# Patient Record
Sex: MALE | Race: BLACK OR AFRICAN AMERICAN | NOT HISPANIC OR LATINO | Employment: UNEMPLOYED | URBAN - METROPOLITAN AREA
[De-identification: names, ages, dates, MRNs, and addresses within clinical notes are randomized per-mention and may not be internally consistent; named-entity substitution may affect disease eponyms.]

---

## 2018-05-26 ENCOUNTER — HOSPITAL ENCOUNTER (EMERGENCY)
Facility: HOSPITAL | Age: 53
Discharge: HOME | End: 2018-05-26
Attending: EMERGENCY MEDICINE
Payer: MEDICAID

## 2018-05-26 VITALS
HEART RATE: 78 BPM | SYSTOLIC BLOOD PRESSURE: 117 MMHG | OXYGEN SATURATION: 98 % | RESPIRATION RATE: 18 BRPM | TEMPERATURE: 98 F | DIASTOLIC BLOOD PRESSURE: 74 MMHG

## 2018-05-26 DIAGNOSIS — D35.2 PITUITARY ADENOMA: Primary | ICD-10-CM

## 2018-05-26 DIAGNOSIS — I95.1 ORTHOSTATIC HYPOTENSION: ICD-10-CM

## 2018-05-26 DIAGNOSIS — R42 DIZZINESS: ICD-10-CM

## 2018-05-26 LAB
ALBUMIN SERPL-MCNC: 3.1 G/DL (ref 3.3–5.5)
ALP SERPL-CCNC: 500 U/L (ref 42–141)
BILIRUB SERPL-MCNC: 1 MG/DL (ref 0.2–1.6)
BUN SERPL-MCNC: 21 MG/DL (ref 7–22)
CALCIUM SERPL-MCNC: 8.8 MG/DL (ref 8–10.3)
CHLORIDE SERPL-SCNC: 93 MMOL/L (ref 98–108)
CREAT SERPL-MCNC: 1.1 MG/DL (ref 0.6–1.2)
GLUCOSE SERPL-MCNC: 87 MG/DL (ref 73–118)
POC ALT (SGPT): 41 U/L (ref 10–47)
POC AST (SGOT): 53 U/L (ref 11–38)
POC TCO2: 26 MMOL/L (ref 18–33)
POTASSIUM BLD-SCNC: 4 MMOL/L (ref 3.6–5.1)
PROTEIN, POC: 7.6 G/DL (ref 6.4–8.1)
SODIUM BLD-SCNC: 133 MMOL/L (ref 128–145)

## 2018-05-26 PROCEDURE — 93005 ELECTROCARDIOGRAM TRACING: CPT

## 2018-05-26 PROCEDURE — 99284 EMERGENCY DEPT VISIT MOD MDM: CPT | Mod: 25

## 2018-05-26 PROCEDURE — 85025 COMPLETE CBC W/AUTO DIFF WBC: CPT

## 2018-05-26 PROCEDURE — 80053 COMPREHEN METABOLIC PANEL: CPT

## 2018-05-26 PROCEDURE — 25000003 PHARM REV CODE 250: Performed by: INTERNAL MEDICINE

## 2018-05-26 PROCEDURE — 93010 ELECTROCARDIOGRAM REPORT: CPT | Mod: ,,, | Performed by: INTERNAL MEDICINE

## 2018-05-26 PROCEDURE — 96360 HYDRATION IV INFUSION INIT: CPT

## 2018-05-26 RX ORDER — SODIUM CHLORIDE 9 MG/ML
1000 INJECTION, SOLUTION INTRAVENOUS ONCE
Status: COMPLETED | OUTPATIENT
Start: 2018-05-26 | End: 2018-05-26

## 2018-05-26 RX ADMIN — SODIUM CHLORIDE 1000 ML: 0.9 INJECTION, SOLUTION INTRAVENOUS at 07:05

## 2018-05-27 NOTE — ED PROVIDER NOTES
"Encounter Date: 5/26/2018       History     Chief Complaint   Patient presents with    Dizziness     Pt's friend reports pt has had balance issues for the past 2 months, eye complaints "it's drifting". Seen at [facility] and diagnosed with stomache bug.     52-year-old male presents to the emergency department with his friend who is a nurse after she states he has had recent weight loss, twisting of the left eye and dizziness.      The history is provided by the patient. No  was used.   Neurologic Problem   The primary symptoms include dizziness. Primary symptoms do not include headaches or seizures. The symptoms began several weeks ago. The symptoms are unchanged. The neurological symptoms are diffuse.   Dizziness does not occur with weakness.   Additional symptoms do not include weakness.     Review of patient's allergies indicates:  No Known Allergies  Past Medical History:   Diagnosis Date    Autism spectrum disorder      No past surgical history on file.  No family history on file.  Social History   Substance Use Topics    Smoking status: Not on file    Smokeless tobacco: Not on file    Alcohol use Not on file     Review of Systems   Neurological: Positive for dizziness and light-headedness. Negative for tremors, seizures, syncope, facial asymmetry, speech difficulty, weakness, numbness and headaches.   All other systems reviewed and are negative.      Physical Exam     Initial Vitals [05/26/18 1831]   BP Pulse Resp Temp SpO2   128/78 93 18 97.7 °F (36.5 °C) 98 %      MAP       94.67         Physical Exam    Nursing note and vitals reviewed.  Constitutional: He appears well-developed and well-nourished. No distress.   HENT:   Head: Normocephalic and atraumatic.   Right Ear: External ear normal.   Left Ear: External ear normal.   Mouth/Throat: Oropharynx is clear and moist.   Eyes: Conjunctivae and EOM are normal. Pupils are equal, round, and reactive to light. Right eye exhibits no " discharge. Left eye exhibits no discharge.   Strabismus noted, but extraocular muscles are intact upon voluntary movement   Neck: Normal range of motion. Neck supple.   Cardiovascular: Normal rate, regular rhythm and normal heart sounds.   Pulmonary/Chest: Breath sounds normal. No respiratory distress.   Abdominal: Soft. Bowel sounds are normal.   Musculoskeletal: Normal range of motion. He exhibits no edema or tenderness.   Neurological: He is alert and oriented to person, place, and time. He has normal strength.   Skin: Skin is warm and dry.   Psychiatric: He has a normal mood and affect. Thought content normal.         ED Course   Procedures  Labs Reviewed   POCT CBC   POCT CMP     EKG Readings: (Independently Interpreted)   Initial Reading: No STEMI.          Medical Decision Making:   Initial Assessment:   52-year-old male presents to the emergency department with his friend who is a nurse after she states he has had recent weight loss, twisting of the left eye and dizziness.  Clinical Tests:   Lab Tests: Ordered and Reviewed  ED Management:  CT of the head reveals possible pituitary tumor .  Patient was advised to follow-up with his primary care physician in 2 days for outpatient MRI to further evaluate pituitary abnormality.                       Clinical Impression:   The primary encounter diagnosis was Pituitary adenoma. Diagnoses of Dizziness and Orthostatic hypotension were also pertinent to this visit.    Disposition:   Disposition: Discharged  Condition: Stable                        Matt Jennings MD  05/27/18 0576

## 2018-05-27 NOTE — ED NOTES
"Pt has no arm and leg drift. Pt AAOX4. Pt able to stand with no assistance. Pt's "friend" states he has been dizzy and has been having a left drifting eye X 6 weeks. Pt himself did not state this when asked.  "

## 2018-05-27 NOTE — ED NOTES
Pt d/c'd with NADN; no complaints voiced; denies any needs; d/c education performed; pt stated understanding; IV removed with tip intact; steady gait OOED

## 2018-06-06 ENCOUNTER — TELEPHONE (OUTPATIENT)
Dept: HEMATOLOGY/ONCOLOGY | Facility: CLINIC | Age: 53
End: 2018-06-06

## 2018-06-06 NOTE — TELEPHONE ENCOUNTER
----- Message from Liliya Young sent at 6/6/2018  1:09 PM CDT -----  Contact: Ms Sadie Mir/   Sister [phone]  Ms Mir states need to speak with nurse.  Patient has Adenocarcinoma of the lung need appointment.    Please call Ms Mir for more detail info [phone]